# Patient Record
Sex: FEMALE | Race: OTHER | HISPANIC OR LATINO
[De-identification: names, ages, dates, MRNs, and addresses within clinical notes are randomized per-mention and may not be internally consistent; named-entity substitution may affect disease eponyms.]

---

## 2020-05-29 PROBLEM — Z00.00 ENCOUNTER FOR PREVENTIVE HEALTH EXAMINATION: Status: ACTIVE | Noted: 2020-05-29

## 2020-07-07 ENCOUNTER — RESULT REVIEW (OUTPATIENT)
Age: 65
End: 2020-07-07

## 2020-07-07 ENCOUNTER — APPOINTMENT (OUTPATIENT)
Dept: HEMATOLOGY ONCOLOGY | Facility: CLINIC | Age: 65
End: 2020-07-07
Payer: MEDICARE

## 2020-07-07 VITALS
SYSTOLIC BLOOD PRESSURE: 148 MMHG | HEART RATE: 98 BPM | TEMPERATURE: 97.9 F | WEIGHT: 137 LBS | HEIGHT: 59.69 IN | DIASTOLIC BLOOD PRESSURE: 73 MMHG | RESPIRATION RATE: 18 BRPM | BODY MASS INDEX: 26.9 KG/M2 | OXYGEN SATURATION: 100 %

## 2020-07-07 DIAGNOSIS — Z78.9 OTHER SPECIFIED HEALTH STATUS: ICD-10-CM

## 2020-07-07 DIAGNOSIS — F17.200 NICOTINE DEPENDENCE, UNSPECIFIED, UNCOMPLICATED: ICD-10-CM

## 2020-07-07 DIAGNOSIS — Z82.49 FAMILY HISTORY OF ISCHEMIC HEART DISEASE AND OTHER DISEASES OF THE CIRCULATORY SYSTEM: ICD-10-CM

## 2020-07-07 DIAGNOSIS — Z83.3 FAMILY HISTORY OF DIABETES MELLITUS: ICD-10-CM

## 2020-07-07 DIAGNOSIS — Z86.39 PERSONAL HISTORY OF OTHER ENDOCRINE, NUTRITIONAL AND METABOLIC DISEASE: ICD-10-CM

## 2020-07-07 PROCEDURE — 99205 OFFICE O/P NEW HI 60 MIN: CPT

## 2020-07-07 RX ORDER — EPINEPHRINE 0.3 MG/.3ML
0.3 INJECTION INTRAMUSCULAR
Refills: 0 | Status: ACTIVE | COMMUNITY

## 2020-07-07 RX ORDER — LISINOPRIL 20 MG/1
20 TABLET ORAL
Refills: 0 | Status: ACTIVE | COMMUNITY

## 2020-07-07 RX ORDER — ESOMEPRAZOLE MAGNESIUM 40 MG/1
40 CAPSULE, DELAYED RELEASE ORAL
Refills: 0 | Status: ACTIVE | COMMUNITY

## 2020-07-07 RX ORDER — CETIRIZINE HYDROCHLORIDE 10 MG/1
10 TABLET, COATED ORAL
Refills: 0 | Status: ACTIVE | COMMUNITY

## 2020-07-15 ENCOUNTER — RESULT REVIEW (OUTPATIENT)
Age: 65
End: 2020-07-15

## 2020-07-24 NOTE — HISTORY OF PRESENT ILLNESS
[de-identified] : 64 year old Deng female presents today for initial consultation of abnormal labs, referred by Dr. Zambrano.  Patient presented to allergist with complaints of hives- ongoing x 4 months.  No associated symptoms reported- had relief from oral antihistamines Cetrizine 2 tabs BID.  Labs performed by allergist showed an elevated Tryptase 22.4- other labs WNL.  \par \par Patient is a current every day smoker 1PPD x 20 years.  She denies any family history of malignancy or hematological disorders.

## 2020-07-24 NOTE — ASSESSMENT
[FreeTextEntry1] : 65 yo Emirati female referred by Dr. Luis Angel Zambrano for evaluation of elevated tryptase and persistent pruritus.\par Unclear etiology in differential mast cell disorder\par Patient denies fevers, night sweats.\par \par Plan :\par CT scan c/a/p to r/o malignancy\par Labs - anemia work up, repeat tryptase, kit mutation.

## 2020-07-29 ENCOUNTER — RESULT REVIEW (OUTPATIENT)
Age: 65
End: 2020-07-29

## 2020-07-29 ENCOUNTER — APPOINTMENT (OUTPATIENT)
Dept: HEMATOLOGY ONCOLOGY | Facility: CLINIC | Age: 65
End: 2020-07-29
Payer: MEDICARE

## 2020-07-29 VITALS
HEART RATE: 85 BPM | RESPIRATION RATE: 20 BRPM | HEIGHT: 59.69 IN | DIASTOLIC BLOOD PRESSURE: 94 MMHG | WEIGHT: 139.99 LBS | SYSTOLIC BLOOD PRESSURE: 168 MMHG | TEMPERATURE: 98.6 F | BODY MASS INDEX: 27.48 KG/M2 | OXYGEN SATURATION: 99 %

## 2020-07-29 DIAGNOSIS — R63.4 ABNORMAL WEIGHT LOSS: ICD-10-CM

## 2020-07-29 PROCEDURE — 99214 OFFICE O/P EST MOD 30 MIN: CPT

## 2020-07-31 PROBLEM — R63.4 WEIGHT LOSS, UNINTENTIONAL: Status: ACTIVE | Noted: 2020-07-07

## 2020-07-31 NOTE — REASON FOR VISIT
[Initial Consultation] : an initial consultation for [FreeTextEntry2] : Pruritus, elevated tryptase, vit B12 defciency

## 2020-07-31 NOTE — ASSESSMENT
[FreeTextEntry1] : 65 yo New Zealander female referred by Dr. Luis Angel Zambrano for evaluation of elevated tryptase and persistent pruritus.\par Unclear etiology in differential mast cell disorder\par Patient denies fevers, night sweats.\par \par CT scan c/a/p to r/o malignancy- no evidence of malignancy \par \par C-kit mutation - negative\par B12 deficiency - started on vit B12 PO - check level.  If not responding order vit B12 SQ\par - B12 panel to celiac and pernicious anemia ordered

## 2020-07-31 NOTE — HISTORY OF PRESENT ILLNESS
[de-identified] : 64 year old Deng female presents today for initial consultation of abnormal labs, referred by Dr. Zambrano.  Patient presented to allergist with complaints of hives- ongoing x 4 months.  No associated symptoms reported- had relief from oral antihistamines Cetrizine 2 tabs BID.  Labs performed by allergist showed an elevated Tryptase 22.4- other labs WNL.  \par \par Patient is a current every day smoker 1PPD x 20 years.  She denies any family history of malignancy or hematological disorders.   [de-identified] : decreased pruritus after vit B12 started

## 2020-09-09 ENCOUNTER — RESULT REVIEW (OUTPATIENT)
Age: 65
End: 2020-09-09

## 2020-09-09 ENCOUNTER — APPOINTMENT (OUTPATIENT)
Dept: HEMATOLOGY ONCOLOGY | Facility: CLINIC | Age: 65
End: 2020-09-09
Payer: MEDICARE

## 2020-09-09 VITALS
OXYGEN SATURATION: 98 % | SYSTOLIC BLOOD PRESSURE: 153 MMHG | HEIGHT: 59 IN | TEMPERATURE: 97.5 F | WEIGHT: 139.8 LBS | DIASTOLIC BLOOD PRESSURE: 71 MMHG | BODY MASS INDEX: 28.18 KG/M2 | RESPIRATION RATE: 18 BRPM | HEART RATE: 90 BPM

## 2020-09-09 PROCEDURE — 99214 OFFICE O/P EST MOD 30 MIN: CPT

## 2020-10-09 NOTE — REVIEW OF SYSTEMS
[Negative] : Allergic/Immunologic [de-identified] : occasional pruritus- improved [FreeTextEntry1] : pruritus

## 2020-10-09 NOTE — REASON FOR VISIT
[Follow-Up Visit] : a follow-up visit for [FreeTextEntry2] : Pruritus, elevated tryptase, vit B12 defciency

## 2020-10-09 NOTE — ASSESSMENT
[FreeTextEntry1] : 64 yo Bahraini female referred by Dr. Luis Angel Zambrano for evaluation of elevated tryptase and persistent pruritus.\par Unclear etiology in differential mast cell disorder\par Patient denies fevers, night sweats.\par \par CT scan c/a/p to r/o malignancy- no evidence of malignancy \par \par C-kit mutation - negative\par B12 deficiency - started on vit B12 PO - check level.Level improving- 648\par \par Repeated tryptase - 23.9, continue monitoring, feels better, stable weight

## 2020-10-09 NOTE — HISTORY OF PRESENT ILLNESS
[de-identified] : 64 year old Deng female presents today for initial consultation of abnormal labs, referred by Dr. Zambrano.  Patient presented to allergist with complaints of hives- ongoing x 4 months.  No associated symptoms reported- had relief from oral antihistamines Cetrizine 2 tabs BID.  Labs performed by allergist showed an elevated Tryptase 22.4- other labs WNL.  \par \par Patient is a current every day smoker 1PPD x 20 years.  She denies any family history of malignancy or hematological disorders.   [de-identified] : Patient presents today for follow up of elevated tryptase, B12 deficiency, and pruritus.  She reports pruritus is not as bad as she is taking Zrytec daily.

## 2020-11-04 ENCOUNTER — RESULT REVIEW (OUTPATIENT)
Age: 65
End: 2020-11-04

## 2020-11-04 ENCOUNTER — APPOINTMENT (OUTPATIENT)
Dept: HEMATOLOGY ONCOLOGY | Facility: CLINIC | Age: 65
End: 2020-11-04
Payer: MEDICARE

## 2020-11-04 VITALS
HEART RATE: 83 BPM | DIASTOLIC BLOOD PRESSURE: 73 MMHG | BODY MASS INDEX: 27.94 KG/M2 | HEIGHT: 59 IN | TEMPERATURE: 98.2 F | SYSTOLIC BLOOD PRESSURE: 172 MMHG | RESPIRATION RATE: 18 BRPM | OXYGEN SATURATION: 99 % | WEIGHT: 138.6 LBS

## 2020-11-04 PROCEDURE — 99214 OFFICE O/P EST MOD 30 MIN: CPT

## 2020-11-04 NOTE — ASSESSMENT
[FreeTextEntry1] : 66 yo Northern Irish female referred by Dr. Luis Angel Zambrano for evaluation of elevated tryptase and persistent pruritus.\par Unclear etiology in differential mast cell disorder\par Patient denies fevers, night sweats.\par \par CT scan c/a/p to r/o malignancy- no evidence of malignancy \par \par C-kit mutation - negative\par B12 deficiency started with B12 231- started on vit B12 PO - check level.Level improving- 648 with resolved pruritus \par \par Repeated tryptase - 23.9, continue monitoring,\par \par Colitis with bleeding - referred to Dr. Aneesh Boston for colonoscopy.  Evaluate with ferritin.\par EGD done in August 2020

## 2020-11-04 NOTE — REVIEW OF SYSTEMS
[Negative] : Allergic/Immunologic [de-identified] : occasional pruritus- improved [FreeTextEntry1] : pruritus

## 2020-11-04 NOTE — HISTORY OF PRESENT ILLNESS
[de-identified] : 64 year old Deng female presents today for initial consultation of abnormal labs, referred by Dr. Zambrano.  Patient presented to allergist with complaints of hives- ongoing x 4 months.  No associated symptoms reported- had relief from oral antihistamines Cetrizine 2 tabs BID.  Labs performed by allergist showed an elevated Tryptase 22.4- other labs WNL.  \par \par Patient is a current every day smoker 1PPD x 20 years.  She denies any family history of malignancy or hematological disorders.   [de-identified] : blood diarrhea x 24 h - admitted with colitis\par pruritus resolved, stopped Zyrtec \par takes B12

## 2021-05-12 ENCOUNTER — APPOINTMENT (OUTPATIENT)
Dept: HEMATOLOGY ONCOLOGY | Facility: CLINIC | Age: 66
End: 2021-05-12

## 2021-05-13 ENCOUNTER — RESULT REVIEW (OUTPATIENT)
Age: 66
End: 2021-05-13

## 2021-05-13 ENCOUNTER — APPOINTMENT (OUTPATIENT)
Dept: HEMATOLOGY ONCOLOGY | Facility: CLINIC | Age: 66
End: 2021-05-13
Payer: MEDICARE

## 2021-05-13 VITALS
HEIGHT: 59 IN | SYSTOLIC BLOOD PRESSURE: 158 MMHG | WEIGHT: 138 LBS | RESPIRATION RATE: 16 BRPM | OXYGEN SATURATION: 98 % | HEART RATE: 93 BPM | TEMPERATURE: 97.6 F | BODY MASS INDEX: 27.82 KG/M2 | DIASTOLIC BLOOD PRESSURE: 79 MMHG

## 2021-05-13 PROCEDURE — 99213 OFFICE O/P EST LOW 20 MIN: CPT

## 2021-05-13 RX ORDER — ACETAMINOPHEN/DIPHENHYDRAMINE 500MG-25MG
TABLET ORAL
Refills: 0 | Status: ACTIVE | COMMUNITY

## 2021-05-13 RX ORDER — ASPIRIN 81 MG/1
81 TABLET, CHEWABLE ORAL
Refills: 0 | Status: COMPLETED | COMMUNITY
End: 2021-05-13

## 2021-05-13 NOTE — REVIEW OF SYSTEMS
[Negative] : Allergic/Immunologic [Fatigue] : fatigue [de-identified] : occasional pruritus- improved [FreeTextEntry1] : pruritus

## 2021-05-13 NOTE — HISTORY OF PRESENT ILLNESS
[de-identified] : 64 year old Deng female presents today for initial consultation of abnormal labs, referred by Dr. Zambrano.  Patient presented to allergist with complaints of hives- ongoing x 4 months.  No associated symptoms reported- had relief from oral antihistamines Cetrizine 2 tabs BID.  Labs performed by allergist showed an elevated Tryptase 22.4- other labs WNL.  \par \par Patient is a current every day smoker 1PPD x 20 years.  She denies any family history of malignancy or hematological disorders.   [de-identified] : Patient presents today for follow up of b12 deficiency, pruritus and elevated tryptase.  Felling better since starting B12

## 2021-05-13 NOTE — ASSESSMENT
[FreeTextEntry1] : 66 yo Icelandic female referred by Dr. Luis Angel Zambrano for evaluation of elevated tryptase and persistent pruritus.\par Unclear etiology in differential mast cell disorder\par Patient denies fevers, night sweats.\par \par CT scan c/a/p to r/o malignancy- no evidence of malignancy \par \par C-kit mutation - negative\par B12 deficiency started with B12 231- started on vit B12 PO - check level.Level improving- 648 with resolved pruritus \par \par Repeated tryptase - 23.9, continue monitoring,\par \par Colitis with bleeding - referred to Dr. Aneesh Boston for colonoscopy.  Evaluate with ferritin.\par EGD done in August 2020\par \par 5/13/2021\par feels better since starting B12\par denies pruritus, night sweats, and fevers\par colitis resolved- check irons today\par tryptase 24.1- recheck today \par \par case and mgmt discussed with Dr. Beal- pt to return in 4 months for follow up cbc chem irons and tryptase

## 2021-09-09 ENCOUNTER — APPOINTMENT (OUTPATIENT)
Dept: HEMATOLOGY ONCOLOGY | Facility: CLINIC | Age: 66
End: 2021-09-09
Payer: MEDICARE

## 2021-09-13 ENCOUNTER — RESULT REVIEW (OUTPATIENT)
Age: 66
End: 2021-09-13

## 2021-09-13 ENCOUNTER — APPOINTMENT (OUTPATIENT)
Dept: HEMATOLOGY ONCOLOGY | Facility: CLINIC | Age: 66
End: 2021-09-13
Payer: MEDICARE

## 2021-09-13 VITALS
BODY MASS INDEX: 27 KG/M2 | HEART RATE: 87 BPM | SYSTOLIC BLOOD PRESSURE: 167 MMHG | RESPIRATION RATE: 16 BRPM | TEMPERATURE: 97.6 F | WEIGHT: 133.9 LBS | OXYGEN SATURATION: 98 % | HEIGHT: 59 IN | DIASTOLIC BLOOD PRESSURE: 80 MMHG

## 2021-09-13 PROCEDURE — 99214 OFFICE O/P EST MOD 30 MIN: CPT

## 2021-09-13 RX ORDER — METRONIDAZOLE 500 MG/1
500 TABLET ORAL
Qty: 21 | Refills: 0 | Status: DISCONTINUED | COMMUNITY
Start: 2021-06-25 | End: 2021-09-13

## 2021-09-13 RX ORDER — CIPROFLOXACIN HYDROCHLORIDE 500 MG/1
500 TABLET, FILM COATED ORAL
Qty: 14 | Refills: 0 | Status: DISCONTINUED | COMMUNITY
Start: 2021-06-25 | End: 2021-09-13

## 2021-09-13 RX ORDER — AMLODIPINE BESYLATE 10 MG/1
10 TABLET ORAL
Qty: 30 | Refills: 0 | Status: ACTIVE | COMMUNITY
Start: 2021-06-21

## 2021-09-13 RX ORDER — ATORVASTATIN CALCIUM 40 MG/1
40 TABLET, FILM COATED ORAL
Qty: 30 | Refills: 0 | Status: ACTIVE | COMMUNITY
Start: 2021-06-21

## 2021-09-13 RX ORDER — ATORVASTATIN CALCIUM 10 MG/1
10 TABLET, FILM COATED ORAL
Refills: 0 | Status: DISCONTINUED | COMMUNITY
End: 2021-09-13

## 2021-09-13 RX ORDER — ACETAMINOPHEN/DIPHENHYDRAMINE 500MG-25MG
81 TABLET ORAL
Qty: 30 | Refills: 0 | Status: DISCONTINUED | COMMUNITY
Start: 2020-09-16 | End: 2021-09-13

## 2021-09-13 RX ORDER — IBUPROFEN 600 MG/1
600 TABLET, FILM COATED ORAL
Qty: 30 | Refills: 0 | Status: DISCONTINUED | COMMUNITY
Start: 2021-05-25 | End: 2021-09-13

## 2021-09-13 RX ORDER — AMLODIPINE BESYLATE 5 MG/1
5 TABLET ORAL
Refills: 0 | Status: DISCONTINUED | COMMUNITY
End: 2021-09-13

## 2021-09-13 RX ORDER — FEXOFENADINE HYDROCHLORIDE 180 MG/1
180 TABLET ORAL
Refills: 0 | Status: DISCONTINUED | COMMUNITY
End: 2021-09-13

## 2021-09-13 RX ORDER — CLINDAMYCIN HYDROCHLORIDE 300 MG/1
300 CAPSULE ORAL
Qty: 28 | Refills: 0 | Status: DISCONTINUED | COMMUNITY
Start: 2021-05-25 | End: 2021-09-13

## 2021-09-13 RX ORDER — CHLORHEXIDINE GLUCONATE, 0.12% ORAL RINSE 1.2 MG/ML
0.12 SOLUTION DENTAL
Qty: 473 | Refills: 0 | Status: DISCONTINUED | COMMUNITY
Start: 2021-05-25 | End: 2021-09-13

## 2021-09-13 NOTE — HISTORY OF PRESENT ILLNESS
[de-identified] : 64 year old Deng female presents today for initial consultation of abnormal labs, referred by Dr. Zambrano.  Patient presented to allergist with complaints of hives- ongoing x 4 months.  No associated symptoms reported- had relief from oral antihistamines Cetrizine 2 tabs BID.  Labs performed by allergist showed an elevated Tryptase 22.4- other labs WNL.  \par \par Patient is a current every day smoker 1PPD x 20 years.  She denies any family history of malignancy or hematological disorders.   [de-identified] : Patient presents today for follow up of b12 deficiency, pruritus and elevated tryptase.  Felling better since starting B12

## 2021-09-13 NOTE — ASSESSMENT
[FreeTextEntry1] : 64 yo Kazakh female referred by Dr. Luis Angel Zambrano for evaluation of elevated tryptase and persistent pruritus.\par Unclear etiology in differential mast cell disorder\par Patient denies fevers, night sweats.\par \par CT scan c/a/p to r/o malignancy- no evidence of malignancy \par \par C-kit mutation - negative\par B12 deficiency started with B12 231- started on vit B12 PO - check level.Level improving- 648 with resolved pruritus \par \par Repeated tryptase - 23.9, continue monitoring,\par \par Colitis with bleeding - referred to Dr. Aneesh Boston for colonoscopy.  Evaluate with ferritin.\par EGD done in August 2020\par \par 5/13/2021\par feels better since starting B12\par denies pruritus, night sweats, and fevers\par colitis resolved- check irons today\par tryptase 24.1- recheck today \par \par 9/13/2021\par pt feels better\par no pruritus- on B12 recheck level today last visit  1087\par tryptase down to 16.9- repeat today \par having colonoscopy at the end of month with Dr. Brennon Kamara GI at [facility] [phone], no contraindication\par \par \par \par  pt to return in 4 months for follow up cbc chem irons and tryptase

## 2022-03-14 ENCOUNTER — RESULT REVIEW (OUTPATIENT)
Age: 67
End: 2022-03-14

## 2022-03-14 ENCOUNTER — APPOINTMENT (OUTPATIENT)
Dept: HEMATOLOGY ONCOLOGY | Facility: CLINIC | Age: 67
End: 2022-03-14
Payer: MEDICARE

## 2022-03-14 VITALS
SYSTOLIC BLOOD PRESSURE: 182 MMHG | TEMPERATURE: 97.6 F | BODY MASS INDEX: 25.3 KG/M2 | OXYGEN SATURATION: 98 % | HEIGHT: 58.98 IN | HEART RATE: 107 BPM | WEIGHT: 125.5 LBS | RESPIRATION RATE: 16 BRPM | DIASTOLIC BLOOD PRESSURE: 81 MMHG

## 2022-03-14 DIAGNOSIS — K21.9 GASTRO-ESOPHAGEAL REFLUX DISEASE W/OUT ESOPHAGITIS: ICD-10-CM

## 2022-03-14 PROCEDURE — 36415 COLL VENOUS BLD VENIPUNCTURE: CPT

## 2022-03-14 PROCEDURE — 99213 OFFICE O/P EST LOW 20 MIN: CPT | Mod: 25

## 2022-03-14 NOTE — REASON FOR VISIT
[Follow-Up Visit] : a follow-up visit for [FreeTextEntry2] : Pruritus, elevated tryptase, vit B12 deficiency

## 2022-03-14 NOTE — HISTORY OF PRESENT ILLNESS
[de-identified] : 64 year old Deng female presents today for initial consultation of abnormal labs, referred by Dr. Zambrano.  Patient presented to allergist with complaints of hives- ongoing x 4 months.  No associated symptoms reported- had relief from oral antihistamines Cetrizine 2 tabs BID.  Labs performed by allergist showed an elevated Tryptase 22.4- other labs WNL.  \par \par Patient is a current every day smoker 1PPD x 20 years.  She denies any family history of malignancy or hematological disorders.   [de-identified] : Patient presents today for follow up of b12 deficiency, pruritus and elevated tryptase.  Felling better since starting B12

## 2022-03-14 NOTE — REVIEW OF SYSTEMS
[Fatigue] : fatigue [Negative] : Allergic/Immunologic [de-identified] : occasional pruritus- improved [FreeTextEntry1] : pruritus

## 2022-03-14 NOTE — ASSESSMENT
[FreeTextEntry1] : 67 yo Samoan female referred by Dr. Luis Angel Zambrano for evaluation of elevated tryptase and persistent pruritus.\par Unclear etiology in differential mast cell disorder\par Patient denies fevers, night sweats.\par \par CT scan c/a/p to r/o malignancy- no evidence of malignancy \par \par C-kit mutation - negative\par B12 deficiency started with B12 231- started on vit B12 PO - check level.Level improving- 648 with resolved pruritus \par \par Repeated tryptase - 24.9, continue monitoring,\par \par Colitis with bleeding - referred to Dr. Aneesh Boston for colonoscopy.  Evaluate with ferritin.\par EGD done in August 2020\par \par feels better since starting B12\par denies pruritus, night sweats, and fevers\par colitis resolved- check irons today\par \par having colonoscopy at the end of month with Dr. Brennon Kamara GI at [facility] [phone], no contraindication\par Blood drawn in office. Ordered and reviewed.\par \par \par \par \par  pt to return in 6 months for follow up cbc chem irons and tryptase

## 2022-06-27 ENCOUNTER — APPOINTMENT (OUTPATIENT)
Dept: HEMATOLOGY ONCOLOGY | Facility: CLINIC | Age: 67
End: 2022-06-27

## 2022-06-27 ENCOUNTER — RESULT REVIEW (OUTPATIENT)
Age: 67
End: 2022-06-27

## 2022-06-27 VITALS
WEIGHT: 128 LBS | HEART RATE: 90 BPM | TEMPERATURE: 97.9 F | SYSTOLIC BLOOD PRESSURE: 180 MMHG | OXYGEN SATURATION: 97 % | DIASTOLIC BLOOD PRESSURE: 76 MMHG | HEIGHT: 58 IN | RESPIRATION RATE: 16 BRPM | BODY MASS INDEX: 26.87 KG/M2

## 2022-06-27 PROCEDURE — 99214 OFFICE O/P EST MOD 30 MIN: CPT | Mod: 25

## 2022-06-27 PROCEDURE — 36415 COLL VENOUS BLD VENIPUNCTURE: CPT

## 2022-07-04 NOTE — ASSESSMENT
[FreeTextEntry1] : 67 yo Trinidadian female referred by Dr. Luis Angel Zambrano for evaluation of elevated tryptase and persistent pruritus.\par Unclear etiology in differential mast cell disorder\par Patient denies fevers, night sweats.\par \par CT scan c/a/p to r/o malignancy- no evidence of malignancy \par \par C-kit mutation - negative\par B12 deficiency started with B12 231- started on vit B12 PO - check level.Level improving- 648 with resolved pruritus \par \par Repeated tryptase - 24.9,> 29 , schedule for bone marrow biopsy to r/o mastocytosis\par \par feels better since starting B12\par denies pruritus, night sweats, and fevers\par colitis resolved- check irons today\par \par having colonoscopy at the end of month with Dr. Brennon LONG at [facility] [phone], no contraindication\par Blood drawn in office. Ordered and reviewed.\par \par pt to return for follow up cbc chem irons and tryptase, bone marrow biopsy\par \par Blood drawn in office. Ordered and reviewed.\par

## 2022-07-04 NOTE — HISTORY OF PRESENT ILLNESS
[de-identified] : 64 year old Deng female presents today for initial consultation of abnormal labs, referred by Dr. Zambrano.  Patient presented to allergist with complaints of hives- ongoing x 4 months.  No associated symptoms reported- had relief from oral antihistamines Cetrizine 2 tabs BID.  Labs performed by allergist showed an elevated Tryptase 22.4- other labs WNL.  \par \par Patient is a current every day smoker 1PPD x 20 years.  She denies any family history of malignancy or hematological disorders.   [de-identified] : Patient presents today for follow up of b12 deficiency, pruritus and elevated tryptase.  Felling better since starting B12

## 2022-07-24 ENCOUNTER — RESULT REVIEW (OUTPATIENT)
Age: 67
End: 2022-07-24

## 2022-07-25 ENCOUNTER — RESULT REVIEW (OUTPATIENT)
Age: 67
End: 2022-07-25

## 2022-07-25 ENCOUNTER — APPOINTMENT (OUTPATIENT)
Dept: HEMATOLOGY ONCOLOGY | Facility: CLINIC | Age: 67
End: 2022-07-25

## 2022-07-25 VITALS
SYSTOLIC BLOOD PRESSURE: 153 MMHG | BODY MASS INDEX: 26.03 KG/M2 | HEIGHT: 58 IN | DIASTOLIC BLOOD PRESSURE: 71 MMHG | HEART RATE: 79 BPM | RESPIRATION RATE: 16 BRPM | WEIGHT: 124 LBS | TEMPERATURE: 98.3 F | OXYGEN SATURATION: 98 %

## 2022-07-25 PROCEDURE — 99214 OFFICE O/P EST MOD 30 MIN: CPT | Mod: 25

## 2022-07-25 PROCEDURE — 36415 COLL VENOUS BLD VENIPUNCTURE: CPT

## 2022-07-25 PROCEDURE — 38222 DX BONE MARROW BX & ASPIR: CPT | Mod: RT

## 2022-07-25 NOTE — PROCEDURE
[Bone Marrow Biopsy] : bone marrow biopsy [Bone Marrow Aspiration] : bone marrow aspiration  [Patient] : the patient [Verbal Consent Obtained] : verbal consent was obtained prior to the procedure [Patient identification verified] : patient identification verified [Procedure verified and consent obtained] : procedure verified and consent obtained [Laterality verified and correct site marked] : laterality verified and correct site marked [Correct positioning] : correct positioning [Correct implant and/ or special equipment obtained] : correct impact and/ or special equipment obtained [Prone] : prone [Superior iliac spine was identified] : the superior iliac spine was identified. [The right posterior iliac crest was prepped with betadine and draped, using sterile technique.] : The right posterior iliac crest was prepped with betadine and draped, using sterile technique. [Lidocaine was injected and into the periosteum overlying the site.] : Lidocaine was injected and into the periosteum overlying the site. [Aspirate] : aspirate [Cytogenetics] : cytogenetics [FISH] : FISH [Biopsy] : biopsy [Flow Cytometry] : flow cytometry [] : The patient was instructed to remove the bandage the following AM. The patient may bathe. Acetaminophen may be taken for discomfort, as per package directions.If there are any other problems, the patient was instructed to call the office. The patient verbalized understanding, and is aware of the office contact numbers. [FreeTextEntry1] : mastocytosis

## 2022-07-25 NOTE — HISTORY OF PRESENT ILLNESS
[de-identified] : 64 year old Deng female presents today for initial consultation of abnormal labs, referred by Dr. Zambrano.  Patient presented to allergist with complaints of hives- ongoing x 4 months.  No associated symptoms reported- had relief from oral antihistamines Cetrizine 2 tabs BID.  Labs performed by allergist showed an elevated Tryptase 22.4- other labs WNL.  \par \par Patient is a current every day smoker 1PPD x 20 years.  She denies any family history of malignancy or hematological disorders.   [de-identified] : Patient presents today for follow up of b12 deficiency, pruritus and elevated tryptase.  Felling better since starting B12

## 2022-07-25 NOTE — ASSESSMENT
[FreeTextEntry1] : 65 yo Panamanian female referred by Dr. Lusi Angel Zambrano for evaluation of elevated tryptase and persistent pruritus.\par Unclear etiology in differential mast cell disorder\par Patient denies fevers, night sweats.\par \par CT scan c/a/p to r/o malignancy- no evidence of malignancy \par \par C-kit mutation - negative\par B12 deficiency started with B12 231- started on vit B12 PO - check level.Level improving- 648 with resolved pruritus \par \par Repeated tryptase - 24.9,> 29 , schedule for bone marrow biopsy to r/o mastocytosis\par \par feels better since starting B12\par denies pruritus, night sweats, and fevers\par colitis resolved- check irons today\par \par having colonoscopy at the end of month with Dr. Brennon LONG at [facility] [phone], no contraindication\par Bone marrow biopsy - indication to confirm mastocytosis, d/w patient.  Reviewed labs. \par \par pt to return for follow up cbc chem irons and tryptase, bone marrow biopsy in view of trending tryptase level - 29 \par \par Blood drawn in office. Ordered and reviewed.\par

## 2022-07-25 NOTE — REVIEW OF SYSTEMS
[Fatigue] : fatigue [Negative] : Allergic/Immunologic [de-identified] : occasional pruritus- improved [FreeTextEntry1] : pruritus

## 2022-08-15 ENCOUNTER — APPOINTMENT (OUTPATIENT)
Dept: HEMATOLOGY ONCOLOGY | Facility: CLINIC | Age: 67
End: 2022-08-15

## 2022-08-16 ENCOUNTER — RESULT REVIEW (OUTPATIENT)
Age: 67
End: 2022-08-16

## 2022-08-16 ENCOUNTER — APPOINTMENT (OUTPATIENT)
Dept: HEMATOLOGY ONCOLOGY | Facility: CLINIC | Age: 67
End: 2022-08-16

## 2022-08-16 VITALS
OXYGEN SATURATION: 98 % | DIASTOLIC BLOOD PRESSURE: 85 MMHG | TEMPERATURE: 98.2 F | BODY MASS INDEX: 27.04 KG/M2 | WEIGHT: 128.8 LBS | RESPIRATION RATE: 16 BRPM | HEART RATE: 85 BPM | SYSTOLIC BLOOD PRESSURE: 160 MMHG | HEIGHT: 58 IN

## 2022-08-16 DIAGNOSIS — L50.1 IDIOPATHIC URTICARIA: ICD-10-CM

## 2022-08-16 PROCEDURE — 36415 COLL VENOUS BLD VENIPUNCTURE: CPT

## 2022-08-16 PROCEDURE — 99213 OFFICE O/P EST LOW 20 MIN: CPT | Mod: 25

## 2022-08-16 RX ORDER — ALBUTEROL SULFATE 90 UG/1
108 (90 BASE) INHALANT RESPIRATORY (INHALATION)
Qty: 8 | Refills: 0 | Status: ACTIVE | COMMUNITY
Start: 2022-07-19

## 2022-09-13 ENCOUNTER — APPOINTMENT (OUTPATIENT)
Dept: HEMATOLOGY ONCOLOGY | Facility: CLINIC | Age: 67
End: 2022-09-13

## 2022-09-29 PROBLEM — L50.1 IDIOPATHIC URTICARIA: Status: ACTIVE | Noted: 2020-07-07

## 2022-09-29 NOTE — REVIEW OF SYSTEMS
[Fatigue] : no fatigue [de-identified] : occasional pruritus- improved [FreeTextEntry1] : pruritus

## 2022-09-29 NOTE — HISTORY OF PRESENT ILLNESS
[de-identified] : 64 year old Deng female presents today for initial consultation of abnormal labs, referred by Dr. Zambrano.  Patient presented to allergist with complaints of hives- ongoing x 4 months.  No associated symptoms reported- had relief from oral antihistamines Cetrizine 2 tabs BID.  Labs performed by allergist showed an elevated Tryptase 22.4- other labs WNL.  \par \par Patient is a current every day smoker 1PPD x 20 years.  She denies any family history of malignancy or hematological disorders.   [de-identified] : Patient presents today for follow up of b12 deficiency, pruritus and elevated tryptase.  Here to discuss BM BX results

## 2022-09-29 NOTE — ASSESSMENT
[FreeTextEntry1] : 65 yo Tongan female referred by Dr. Luis Angel Zambrano for evaluation of elevated tryptase and persistent pruritus.\par Unclear etiology in differential mast cell disorder\par Patient denies fevers, night sweats.\par \par CT scan c/a/p to r/o malignancy- no evidence of malignancy \par \par C-kit mutation - negative\par B12 deficiency started with B12 231- started on vit B12 PO - check level.Level improving- 648 with resolved pruritus \par \par Repeated tryptase - 24.9,> 29 , schedule for bone marrow biopsy to r/o mastocytosis\par \par feels better since starting B12\par denies pruritus, night sweats, and fevers\par colitis resolved- check irons today\par \par having colonoscopy at the end of month with Dr. Brennon Kamara GI at [facility] [phone], no contraindication\par Bone marrow biopsy - indication to confirm mastocytosis, d/w patient.  Reviewed labs. \par \par Bone marrow biopsy- \par  Pathology             Amended\par \par No Documents Attached\par \par \par \par \par   [facility] Accession Number : [id number]\par Patient:   ADA OJEDA\par \par \par Accession:                             [id number]\par \par Collected Date/Time:                   7/25/2022 09:00 EDT\par Received Date/Time:                    7/25/2022 23:05 EDT\par \par Hematopathology Addendum Report - Auth (Verified)\par \par Hematopathology Addendum\par Comprehensive Hematopathology Report\par \par Final Diagnosis :\par 1, 2. Bone marrow biopsy and bone marrow aspirate\par -  Normocellular bone marrow with trilineage hematopoiesis and\par maturation, increased eosinophils\par \par Diagnostic Note:\par Bone marrow shows increase in eosinophils and precursors; no increase in\par blasts, no increase in abnormal mast cells; no significant fibrosis. The\par overall morphology findings and immunophenotypic analysis do not support\par a neoplastic process; correlation with ancillary studies and clinical\par history is necessary.\par Please note findings of normal female karyotype; FISH negative for BCR/\par ABL1  gene rearrangement. 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 Biopsy:  Sections of bone marrow core biopsy show bone marrow with\par normal cellularity of 30-40%. Trilineage hematopoiesis is present with\par maturation. One non-paratrabecular aggregate of small lymphocytes is\par \par noted. Megakaryocytes are present in normal number and morphology. Iron\par stores are present.\par 2. Aspirate:   The aspirate smears show cellular spicules, adequate for\par evaluation. Erythroid and myeloid precursors are present with maturation,\par normal M:E ratio of 2.5:1, mild eosinophilia (7%) no increase of blasts.\par Small lymphocytes and plasma cells are not increased. Megakaryocytes are\par seen, with unremarkable morphology.\par Review of  peripheral blood smear  shows no leukocytosis; eosinophil\par percentage is borderline; no significant eosinophilia.\par \par Bone Marrow Aspirate Differential: (300 Cells).\par \par \par \par \par \par \par \par \par Blood drawn in office. Ordered and reviewed, case and mgmt discussed with Dr. Beal \par

## 2022-11-15 ENCOUNTER — APPOINTMENT (OUTPATIENT)
Dept: HEMATOLOGY ONCOLOGY | Facility: CLINIC | Age: 67
End: 2022-11-15

## 2022-11-15 ENCOUNTER — RESULT REVIEW (OUTPATIENT)
Age: 67
End: 2022-11-15

## 2022-11-15 VITALS
HEIGHT: 58 IN | WEIGHT: 133.19 LBS | RESPIRATION RATE: 16 BRPM | SYSTOLIC BLOOD PRESSURE: 160 MMHG | OXYGEN SATURATION: 99 % | TEMPERATURE: 97.5 F | BODY MASS INDEX: 27.96 KG/M2 | HEART RATE: 89 BPM | DIASTOLIC BLOOD PRESSURE: 67 MMHG

## 2022-11-15 DIAGNOSIS — I10 ESSENTIAL (PRIMARY) HYPERTENSION: ICD-10-CM

## 2022-11-15 DIAGNOSIS — E53.8 DEFICIENCY OF OTHER SPECIFIED B GROUP VITAMINS: ICD-10-CM

## 2022-11-15 DIAGNOSIS — R74.8 ABNORMAL LEVELS OF OTHER SERUM ENZYMES: ICD-10-CM

## 2022-11-15 DIAGNOSIS — D47.09 OTHER MAST CELL NEOPLASMS OF UNCERTAIN BEHAVIOR: ICD-10-CM

## 2022-11-15 PROCEDURE — 99214 OFFICE O/P EST MOD 30 MIN: CPT | Mod: 25

## 2022-11-15 PROCEDURE — 36415 COLL VENOUS BLD VENIPUNCTURE: CPT

## 2022-11-15 RX ORDER — ACETAMINOPHEN/DIPHENHYDRAMINE 500MG-25MG
81 TABLET ORAL
Qty: 30 | Refills: 0 | Status: COMPLETED | COMMUNITY
Start: 2020-09-16 | End: 2022-11-15

## 2022-11-15 RX ORDER — CHLORHEXIDINE GLUCONATE, 0.12% ORAL RINSE 1.2 MG/ML
0.12 SOLUTION DENTAL
Qty: 473 | Refills: 0 | Status: COMPLETED | COMMUNITY
Start: 2021-05-25 | End: 2022-11-15

## 2022-11-15 RX ORDER — ACETAMINOPHEN 325 MG/1
325 TABLET, FILM COATED ORAL
Refills: 0 | Status: ACTIVE | COMMUNITY

## 2022-11-15 RX ORDER — SODIUM SULFATE, POTASSIUM SULFATE, MAGNESIUM SULFATE 17.5; 3.13; 1.6 G/ML; G/ML; G/ML
17.5-3.13-1.6 SOLUTION, CONCENTRATE ORAL
Qty: 354 | Refills: 0 | Status: COMPLETED | COMMUNITY
Start: 2021-08-25 | End: 2022-11-15

## 2022-11-15 RX ORDER — SACCHAROMYCES BOULARDII 50 MG
CAPSULE ORAL
Refills: 0 | Status: ACTIVE | COMMUNITY

## 2022-12-19 PROBLEM — R74.8 ELEVATED SERUM TRYPTASE: Status: ACTIVE | Noted: 2020-07-07

## 2022-12-19 NOTE — ASSESSMENT
[FreeTextEntry1] : 68 yo Montserr[place] female referred by Dr. Luis Angel Zambrano for evaluation of elevated tryptase and persistent pruritus.\par Unclear etiology in differential mast cell disorder\par Patient denies fevers, night sweats.\par \par CT scan c/a/p to r/o malignancy- no evidence of malignancy \par \par C-kit mutation - negative\par B12 deficiency started with B12 231- started on vit B12 PO - check level.Level improving- 648 with resolved pruritus \par \par Repeated tryptase - 24.9,> 29>22.25.24 , \par feels better since starting B12\par denies pruritus, night sweats, and fevers\par colitis resolved- check irons today\par \par having colonoscopy at the end of month with Dr. Brennon LONG at [facility] [phone], no contraindication\par Bone marrow biopsy - indication to confirm mastocytosis, d/w patient.  Reviewed labs. \par \par . Bone marrow biopsy and bone marrow aspirate\par -  Normocellular bone marrow with trilineage hematopoiesis and\par maturation, increased eosinophils\par \par Diagnostic Note:\par Bone marrow shows increase in eosinophils and precursors; no increase in\par blasts, no increase in abnormal mast cells; no significant fibrosis. The\par overall morphology findings and immunophenotypic analysis do not support\par a neoplastic process; correlation with ancillary studies and clinical\par history is necessary.\par Please note findings of normal female karyotype; FISH negative for BCR/\par ABL1  gene rearrangement. DNMT3A p.? 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 The patient participated in shared- decision making session where the benefits, limitations and potential harms were discussed. \par The patient was determined to be eligible for screening CT based on age, calculation of  smoking pack- years. \par The patient was informed of the importance of adherence to annual screening, impact of comorbidities.\par \par The patient was informed of the offer of tobacco cessation  counselling services.\par The patient has no sign of lung cancer. \par The patient is over 20ppy.\par The patient is a current smoker. Offered referral to smoking cessation program.\par \par \par

## 2022-12-19 NOTE — HISTORY OF PRESENT ILLNESS
[de-identified] : 64 year old Deng female presents today for initial consultation of abnormal labs, referred by Dr. Zambrano.  Patient presented to allergist with complaints of hives- ongoing x 4 months.  No associated symptoms reported- had relief from oral antihistamines Cetrizine 2 tabs BID.  Labs performed by allergist showed an elevated Tryptase 22.4- other labs WNL.  \par \par Patient is a current every day smoker 1PPD x 20 years.  She denies any family history of malignancy or hematological disorders.   [de-identified] :  Marcelle #[id number]\par \par Patient presents today for follow up of b12 deficiency, pruritus and elevated tryptase.  Pt overall feels well. No new complaints

## 2023-05-18 ENCOUNTER — APPOINTMENT (OUTPATIENT)
Dept: HEMATOLOGY ONCOLOGY | Facility: CLINIC | Age: 68
End: 2023-05-18

## 2024-09-18 ENCOUNTER — OFFICE VISIT (OUTPATIENT)
Facility: LOCATION | Age: 69
End: 2024-09-18
Payer: COMMERCIAL

## 2024-09-18 DIAGNOSIS — E11.9 TYPE 2 DIABETES MELLITUS WITHOUT COMPLICATIONS: Primary | ICD-10-CM

## 2024-09-18 DIAGNOSIS — H25.13 AGE-RELATED NUCLEAR CATARACT, BILATERAL: ICD-10-CM

## 2024-09-18 PROCEDURE — 92014 COMPRE OPH EXAM EST PT 1/>: CPT | Performed by: OPHTHALMOLOGY

## 2024-09-18 ASSESSMENT — INTRAOCULAR PRESSURE
OD: 14
OS: 18